# Patient Record
Sex: FEMALE | Race: WHITE | NOT HISPANIC OR LATINO | Employment: OTHER | URBAN - METROPOLITAN AREA
[De-identification: names, ages, dates, MRNs, and addresses within clinical notes are randomized per-mention and may not be internally consistent; named-entity substitution may affect disease eponyms.]

---

## 2017-10-06 ENCOUNTER — HOSPITAL ENCOUNTER (OUTPATIENT)
Dept: MAMMOGRAPHY | Facility: CLINIC | Age: 77
Discharge: HOME | End: 2017-10-06
Attending: INTERNAL MEDICINE | Admitting: INTERNAL MEDICINE
Payer: MEDICARE

## 2017-10-06 DIAGNOSIS — Z12.31 VISIT FOR SCREENING MAMMOGRAM: ICD-10-CM

## 2017-10-06 PROCEDURE — G0202 SCR MAMMO BI INCL CAD: HCPCS

## 2018-11-02 ENCOUNTER — HOSPITAL ENCOUNTER (OUTPATIENT)
Dept: MAMMOGRAPHY | Facility: CLINIC | Age: 78
Discharge: HOME | End: 2018-11-02
Attending: INTERNAL MEDICINE | Admitting: INTERNAL MEDICINE
Payer: MEDICARE

## 2018-11-02 DIAGNOSIS — Z12.31 VISIT FOR SCREENING MAMMOGRAM: ICD-10-CM

## 2018-11-02 PROCEDURE — 77067 SCR MAMMO BI INCL CAD: CPT

## 2024-01-29 ENCOUNTER — DOCUMENTATION ONLY (OUTPATIENT)
Dept: GERIATRICS | Facility: CLINIC | Age: 84
End: 2024-01-29
Payer: COMMERCIAL

## 2024-01-30 ENCOUNTER — ASSISTED LIVING VISIT (OUTPATIENT)
Dept: GERIATRICS | Facility: CLINIC | Age: 84
End: 2024-01-30
Payer: COMMERCIAL

## 2024-01-30 ENCOUNTER — DOCUMENTATION ONLY (OUTPATIENT)
Dept: OTHER | Facility: CLINIC | Age: 84
End: 2024-01-30

## 2024-01-30 VITALS
DIASTOLIC BLOOD PRESSURE: 68 MMHG | BODY MASS INDEX: 23.96 KG/M2 | TEMPERATURE: 98.2 F | WEIGHT: 143.8 LBS | OXYGEN SATURATION: 98 % | HEART RATE: 98 BPM | SYSTOLIC BLOOD PRESSURE: 151 MMHG | RESPIRATION RATE: 18 BRPM | HEIGHT: 65 IN

## 2024-01-30 DIAGNOSIS — K21.9 GASTROESOPHAGEAL REFLUX DISEASE WITHOUT ESOPHAGITIS: ICD-10-CM

## 2024-01-30 DIAGNOSIS — F39 EPISODIC MOOD DISORDER (H): ICD-10-CM

## 2024-01-30 DIAGNOSIS — H91.90 HEARING LOSS, UNSPECIFIED HEARING LOSS TYPE, UNSPECIFIED LATERALITY: Primary | ICD-10-CM

## 2024-01-30 DIAGNOSIS — N25.81 HYPERPARATHYROIDISM, SECONDARY (H): ICD-10-CM

## 2024-01-30 DIAGNOSIS — M85.80 OSTEOPENIA, UNSPECIFIED LOCATION: ICD-10-CM

## 2024-01-30 PROCEDURE — 99344 HOME/RES VST NEW MOD MDM 60: CPT | Performed by: NURSE PRACTITIONER

## 2024-01-30 NOTE — PROGRESS NOTES
[facility]  PRIMARY CARE PROVIDER AND CLINIC:  Yobani Kelly, BROOKE CNP, [address] [address]  Chief Complaint   Patient presents with    Establish Care     [place] Medical Record Number:  [id number]  Place of Service where encounter took place:  [facility] [[id number]]    Kimberley Fernandez  is a 83 year old  (1940),  admitted to the above facility on 1/29/24 .  Admitted to this facility for  medical management and nursing care.    HPI:    HPI information obtained from: facility chart records, facility staff, patient report, and [facility] chart review.     Seen today for start of care. She has moved into AL setting from the community as no longer able to care for her . PMH includes SCC, GERD, Hyperlipidemia, Secondary Hyperparathyroidism, Depression, Osteopenia, B12 and D deficiency. She denies any acute concerns. Any previous medications used have been stopped. She reports being overwhelmed with the move. She does not want to take any medications including vitamins or supplements.     CODE STATUS/ADVANCE DIRECTIVES DISCUSSION:   CPR/Full code   Patient's living condition: lives in an assisted living facility  ALLERGIES: No known drug allergy  PAST MEDICAL HISTORY:  has a past medical history of Cancer (H), Depressive disorder, not elsewhere classified, Esophageal reflux, Impaired fasting glucose, Nonspecific elevation of levels of transaminase or lactic acid dehydrogenase (LDH), OSTEOPENIA (2000), Other and unspecified hyperlipidemia (10/02), and Personal history of colonic polyps (2005).  PAST SURGICAL HISTORY:   has a past surgical history that includes NONSPECIFIC PROCEDURE (1977); NONSPECIFIC PROCEDURE (1985); NONSPECIFIC PROCEDURE (6/00); NONSPECIFIC PROCEDURE; NONSPECIFIC PROCEDURE; partial hysterectomy (age 38); cataracts removed (2010); IMPLANTABLE BREAST PROSTHESIS (2003); and Colonoscopy (11/1/2011).  FAMILY HISTORY: family  "history includes Bone Cancer in her mother; Liver Cancer in her father.  SOCIAL HISTORY:   reports that she has never smoked. She has never used smokeless tobacco. She reports current alcohol use. She reports that she does not use drugs.    Post Discharge Medication Reconciliation Status: unable to reconcile discharge medications due to has stopped all medications     No current outpatient medications on file.     ROS:  4 point ROS including Respiratory, CV, GI and , other than that noted in the HPI,  is negative    Vitals:  BP (!) 151/68   Pulse 98   Temp 98.2  F (36.8  C)   Resp 18   Ht 1.651 m (5' 5\")   Wt 65.2 kg (143 lb 12.8 oz)   SpO2 98%   BMI 23.93 kg/m    Exam:  GENERAL APPEARANCE:  Alert, anxious  ENT:  Mouth and posterior oropharynx normal, moist mucous membranes, [place]-ears clear of wax  EYES:  EOM, conjunctivae, lids, pupils and irises normal  RESP:  lungs clear to auscultation   CV:  Palpation and auscultation of heart done , regular rate and rhythm, no murmur, rub, or gallop, trace edema  ABDOMEN:  no guarding or rebound  M/S:   without assistive device  SKIN:  limited but intact to visualized areas   NEURO:   Cranial nerves 2-12 are normal tested and grossly at patient's baseline  PSYCH:  oriented X 3, anxious    Lab/Diagnostic data:  No recent labs-refused onsite draw    ASSESSMENT/PLAN:  (H91.90) Hearing loss, unspecified hearing loss type, unspecified laterality  (primary encounter diagnosis)  Comment: ongoing   Plan:   -wearing aids, may benefit from pocket talker    (F39) Episodic mood disorder (H24)  Comment: anxious  Plan:   -refused ACP or medication  -would hope in time may settle into AL setting     (N25.81) Hyperparathyroidism, secondary (H24)  Comment: by history has seen endocrinology in past noted indicate could be related to vitamin D deficiency   Plan:   -labs when and if she is willing     (K21.9) Gastroesophageal reflux disease without esophagitis  Comment: hx of   Plan: "   -off medication. Monitor for s/s     (M85.80) Osteopenia, unspecified location  Comment: needs DXA Bone density Spine/hip  Plan:   -refusing recommended calcium and vitamin D    -VS on 1/23 124/68 87, BP/HR elevated on admission     Total time spent with patient visit at the skilled nursing facility was 63 minutes including patient visit, review of past records, phone call to patient contact, and discussion with nursing, aids, DHS     Electronically signed by:  BROOKE Toth CNP

## 2024-01-30 NOTE — LETTER
1/30/2024        RE: Kimberley Fernandez  [facility]  [facility]  [address] [place] [address]        [facility]  PRIMARY CARE PROVIDER AND CLINIC:  BROOKE Toth [facility], [address] [facility] [place] [address]  Chief Complaint   Patient presents with     Establish Care     [place] Medical Record Number:  [id number]  Place of Service where encounter took place:  [facility] [[id number]]    Kimberley Fernandez  is a 83 year old  (1940),  admitted to the above facility on 1/29/24 .  Admitted to this facility for  medical management and nursing care.    HPI:    HPI information obtained from: facility chart records, facility staff, patient report, and [facility] chart review.     Seen today for start of care. She has moved into AL setting from the community as no longer able to care for her . PMH includes SCC, GERD, Hyperlipidemia, Secondary Hyperparathyroidism, Depression, Osteopenia, B12 and D deficiency. She denies any acute concerns. Any previous medications used have been stopped. She reports being overwhelmed with the move. She does not want to take any medications including vitamins or supplements.     CODE STATUS/ADVANCE DIRECTIVES DISCUSSION:   CPR/Full code   Patient's living condition: lives in an assisted living facility  ALLERGIES: No known drug allergy  PAST MEDICAL HISTORY:  has a past medical history of Cancer (H), Depressive disorder, not elsewhere classified, Esophageal reflux, Impaired fasting glucose, Nonspecific elevation of levels of transaminase or lactic acid dehydrogenase (LDH), OSTEOPENIA (2000), Other and unspecified hyperlipidemia (10/02), and Personal history of colonic polyps (2005).  PAST SURGICAL HISTORY:   has a past surgical history that includes NONSPECIFIC PROCEDURE (1977); NONSPECIFIC PROCEDURE (1985); NONSPECIFIC PROCEDURE (6/00); NONSPECIFIC PROCEDURE; NONSPECIFIC PROCEDURE; partial hysterectomy (age 38);  "cataracts removed (2010); IMPLANTABLE BREAST PROSTHESIS (2003); and Colonoscopy (11/1/2011).  FAMILY HISTORY: family history includes Bone Cancer in her mother; Liver Cancer in her father.  SOCIAL HISTORY:   reports that she has never smoked. She has never used smokeless tobacco. She reports current alcohol use. She reports that she does not use drugs.    Post Discharge Medication Reconciliation Status: unable to reconcile discharge medications due to has stopped all medications     No current outpatient medications on file.     ROS:  4 point ROS including Respiratory, CV, GI and , other than that noted in the HPI,  is negative    Vitals:  BP (!) 151/68   Pulse 98   Temp 98.2  F (36.8  C)   Resp 18   Ht 1.651 m (5' 5\")   Wt 65.2 kg (143 lb 12.8 oz)   SpO2 98%   BMI 23.93 kg/m    Exam:  GENERAL APPEARANCE:  Alert, anxious  ENT:  Mouth and posterior oropharynx normal, moist mucous membranes, [place]-ears clear of wax  EYES:  EOM, conjunctivae, lids, pupils and irises normal  RESP:  lungs clear to auscultation   CV:  Palpation and auscultation of heart done , regular rate and rhythm, no murmur, rub, or gallop, trace edema  ABDOMEN:  no guarding or rebound  M/S:   without assistive device  SKIN:  limited but intact to visualized areas   NEURO:   Cranial nerves 2-12 are normal tested and grossly at patient's baseline  PSYCH:  oriented X 3, anxious    Lab/Diagnostic data:  No recent labs-refused onsite draw    ASSESSMENT/PLAN:  (H91.90) Hearing loss, unspecified hearing loss type, unspecified laterality  (primary encounter diagnosis)  Comment: ongoing   Plan:   -wearing aids, may benefit from pocket talker    (F39) Episodic mood disorder (H24)  Comment: anxious  Plan:   -refused ACP or medication  -would hope in time may settle into AL setting     (N25.81) Hyperparathyroidism, secondary (H24)  Comment: by history has seen endocrinology in past noted indicate could be related to vitamin D deficiency   Plan:   -labs " when and if she is willing     (K21.9) Gastroesophageal reflux disease without esophagitis  Comment: hx of   Plan:   -off medication. Monitor for s/s     (M85.80) Osteopenia, unspecified location  Comment: needs DXA Bone density Spine/hip  Plan:   -refusing recommended calcium and vitamin D    -VS on 1/23 124/68 87, BP/HR elevated on admission     Total time spent with patient visit at the [facility] nursing [facility] was 63 minutes including patient visit, review of past records, phone call to patient contact, and discussion with nursing, aids, DHS     Electronically signed by:  BROOKE Toth CNP                     Sincerely,        BROOKE Toth CNP

## 2024-02-05 ENCOUNTER — DOCUMENTATION ONLY (OUTPATIENT)
Dept: OTHER | Facility: CLINIC | Age: 84
End: 2024-02-05
Payer: COMMERCIAL

## 2024-02-13 ENCOUNTER — ASSISTED LIVING VISIT (OUTPATIENT)
Dept: GERIATRICS | Facility: CLINIC | Age: 84
End: 2024-02-13
Payer: COMMERCIAL

## 2024-02-13 VITALS
BODY MASS INDEX: 23.59 KG/M2 | SYSTOLIC BLOOD PRESSURE: 119 MMHG | TEMPERATURE: 97.6 F | DIASTOLIC BLOOD PRESSURE: 60 MMHG | RESPIRATION RATE: 20 BRPM | WEIGHT: 141.6 LBS | HEART RATE: 92 BPM | OXYGEN SATURATION: 97 % | HEIGHT: 65 IN

## 2024-02-13 DIAGNOSIS — N25.81 HYPERPARATHYROIDISM, SECONDARY (H): ICD-10-CM

## 2024-02-13 DIAGNOSIS — E78.5 HYPERLIPIDEMIA LDL GOAL <130: ICD-10-CM

## 2024-02-13 DIAGNOSIS — I10 PRIMARY HYPERTENSION: ICD-10-CM

## 2024-02-13 DIAGNOSIS — F39 EPISODIC MOOD DISORDER (H): Primary | ICD-10-CM

## 2024-02-13 DIAGNOSIS — E53.8 VITAMIN B12 DEFICIENCY WITHOUT ANEMIA: ICD-10-CM

## 2024-02-13 DIAGNOSIS — E54 VITAMIN C DEFICIENCY: ICD-10-CM

## 2024-02-13 PROCEDURE — 99349 HOME/RES VST EST MOD MDM 40: CPT | Performed by: NURSE PRACTITIONER

## 2024-02-13 NOTE — LETTER
Faby Fernandez        BMP, CBC, LFTs,  for HTN  B12, for B12 deficiency  D for D deficiency   Lipid for hyperlipidemia   TSH for thyroid screen    PTH for hyperparathyroidism         Yobani Kelly, BROOKE CNP on 2/13/2024 at 5:45 PM

## 2024-02-13 NOTE — LETTER
"    2/13/2024        RE: Kimberley Edwards  [address] [place] [address]        [facility]  [place] Medical Record Number:  [id number]  Place of Service where encounter took place:  [facility] [[id number]]  Chief Complaint   Patient presents with     RECHECK       HPI:    Kimberley Fernandez  is a 83 year old (1940), who is being seen today for an episodic care visit.  HPI information obtained from: patient report. Today's concern is:    Seen today for follow up to start of care. Appears less stressed. Says she is sleeping better. No acute concerns. Is thinking about ACP therapy. Has the paperwork but not ready to complete. Asked about obtaining some updated labs. \"Don't want to know if anything is wrong with me\". Agrees for labs only since her sister was recently hospitalized with CHF. PMH includes SCC, GERD, Hyperlipidemia, Secondary Hyperparathyroidism, Depression, Osteopenia, B12 and D deficiency. Any previous medications used have been stopped and she does not believe in taking pills.     Past Medical and Surgical History reviewed in Epic today.    MEDICATIONS:    No current outpatient medications on file.       REVIEW OF SYSTEMS:  4 point ROS including Respiratory, CV, GI and , other than that noted in the HPI,  is negative    Objective:  /60   Pulse 92   Temp 97.6  F (36.4  C)   Resp 20   Ht 1.651 m (5' 5\")   Wt 64.2 kg (141 lb 9.6 oz)   SpO2 97%   BMI 23.56 kg/m    Exam:  GENERAL APPEARANCE:  Alert, less anxious  ENT:  Mouth and posterior oropharynx normal, moist mucous membranes, [place]-ears clear of wax  EYES:  EOM, conjunctivae, lids, pupils and irises normal  RESP:  lungs clear to auscultation   CV:  Palpation and auscultation of heart done , regular rate and rhythm, no murmur, rub, or gallop, trace edema  ABDOMEN:  no guarding or rebound  M/S:   without assistive device  SKIN:  limited but intact to visualized " areas, dry legs, ankles and feet  NEURO:   Cranial nerves 2-12 are normal tested and grossly at patient's baseline  PSYCH:  oriented X 3, anxious    Labs:   Agreed for updated labs today    BMP, CBC, LFTs, B12, D, Lipid, TSH, PTH    ASSESSMENT/PLAN:  (F39) Episodic mood disorder (H24)  (primary encounter diagnosis)  (E53.8) Vitamin B12 deficiency without anemia  (E54) Vitamin C deficiency  (E78.5) Hyperlipidemia LDL goal <130  (N25.81) Hyperparathyroidism, secondary (H24)  (I10) Primary hypertension  Comment: difficulty with transition to AL from community   Plan:   -eval to ACP has been given  -updated labs      Electronically signed by:  BROOKE Toth CNP             Sincerely,        BROOKE Toth CNP

## 2024-02-13 NOTE — PROGRESS NOTES
"[facility]  [place] Medical Record Number:  [id number]  Place of Service where encounter took place:  [facility] [[id number]]  Chief Complaint   Patient presents with    RECHECK       HPI:    Kimberley Fernandez  is a 83 year old (1940), who is being seen today for an episodic care visit.  HPI information obtained from: patient report. Today's concern is:    Seen today for follow up to start of care. Appears less stressed. Says she is sleeping better. No acute concerns. Is thinking about ACP therapy. Has the paperwork but not ready to complete. Asked about obtaining some updated labs. \"Don't want to know if anything is wrong with me\". Agrees for labs only since her sister was recently hospitalized with CHF. PMH includes SCC, GERD, Hyperlipidemia, Secondary Hyperparathyroidism, Depression, Osteopenia, B12 and D deficiency. Any previous medications used have been stopped and she does not believe in taking pills.     Past Medical and Surgical History reviewed in Epic today.    MEDICATIONS:    No current outpatient medications on file.       REVIEW OF SYSTEMS:  4 point ROS including Respiratory, CV, GI and , other than that noted in the HPI,  is negative    Objective:  /60   Pulse 92   Temp 97.6  F (36.4  C)   Resp 20   Ht 1.651 m (5' 5\")   Wt 64.2 kg (141 lb 9.6 oz)   SpO2 97%   BMI 23.56 kg/m    Exam:  GENERAL APPEARANCE:  Alert, less anxious  ENT:  Mouth and posterior oropharynx normal, moist mucous membranes, [place]-ears clear of wax  EYES:  EOM, conjunctivae, lids, pupils and irises normal  RESP:  lungs clear to auscultation   CV:  Palpation and auscultation of heart done , regular rate and rhythm, no murmur, rub, or gallop, trace edema  ABDOMEN:  no guarding or rebound  M/S:   without assistive device  SKIN:  limited but intact to visualized areas, dry legs, ankles and feet  NEURO:   Cranial nerves 2-12 are normal tested and grossly at patient's " baseline  PSYCH:  oriented X 3, anxious    Labs:   Agreed for updated labs today    BMP, CBC, LFTs, B12, D, Lipid, TSH, PTH    ASSESSMENT/PLAN:  (F39) Episodic mood disorder (H24)  (primary encounter diagnosis)  (E53.8) Vitamin B12 deficiency without anemia  (E54) Vitamin C deficiency  (E78.5) Hyperlipidemia LDL goal <130  (N25.81) Hyperparathyroidism, secondary (H24)  (I10) Primary hypertension  Comment: difficulty with transition to AL from community   Plan:   -eval to ACP has been given  -updated labs      Electronically signed by:  BROOKE Toth CNP

## 2024-02-14 ENCOUNTER — LAB REQUISITION (OUTPATIENT)
Dept: LAB | Facility: CLINIC | Age: 84
End: 2024-02-14
Payer: COMMERCIAL

## 2024-02-14 DIAGNOSIS — E03.9 HYPOTHYROIDISM, UNSPECIFIED: ICD-10-CM

## 2024-02-14 DIAGNOSIS — E55.9 VITAMIN D DEFICIENCY, UNSPECIFIED: ICD-10-CM

## 2024-02-14 DIAGNOSIS — E78.5 HYPERLIPIDEMIA, UNSPECIFIED: ICD-10-CM

## 2024-02-14 DIAGNOSIS — D51.9 VITAMIN B12 DEFICIENCY ANEMIA, UNSPECIFIED: ICD-10-CM

## 2024-02-14 DIAGNOSIS — I10 ESSENTIAL (PRIMARY) HYPERTENSION: ICD-10-CM

## 2024-02-20 LAB
ALBUMIN SERPL BCG-MCNC: 3.8 G/DL (ref 3.5–5.2)
ALP SERPL-CCNC: 104 U/L (ref 40–150)
ALT SERPL W P-5'-P-CCNC: 14 U/L (ref 0–50)
ANION GAP SERPL CALCULATED.3IONS-SCNC: 9 MMOL/L (ref 7–15)
AST SERPL W P-5'-P-CCNC: 19 U/L (ref 0–45)
BILIRUB DIRECT SERPL-MCNC: <0.2 MG/DL (ref 0–0.3)
BILIRUB SERPL-MCNC: 0.2 MG/DL
BUN SERPL-MCNC: 17 MG/DL (ref 8–23)
CALCIUM SERPL-MCNC: 9.1 MG/DL (ref 8.8–10.2)
CHLORIDE SERPL-SCNC: 105 MMOL/L (ref 98–107)
CHOLEST SERPL-MCNC: 212 MG/DL
CREAT SERPL-MCNC: 0.98 MG/DL (ref 0.51–0.95)
DEPRECATED HCO3 PLAS-SCNC: 26 MMOL/L (ref 22–29)
EGFRCR SERPLBLD CKD-EPI 2021: 56 ML/MIN/1.73M2
ERYTHROCYTE [DISTWIDTH] IN BLOOD BY AUTOMATED COUNT: 14.8 % (ref 10–15)
FASTING STATUS PATIENT QL REPORTED: ABNORMAL
GLUCOSE SERPL-MCNC: 95 MG/DL (ref 70–99)
HCT VFR BLD AUTO: 44.4 % (ref 35–47)
HDLC SERPL-MCNC: 46 MG/DL
HGB BLD-MCNC: 14.1 G/DL (ref 11.7–15.7)
LDLC SERPL CALC-MCNC: 141 MG/DL
MCH RBC QN AUTO: 28.2 PG (ref 26.5–33)
MCHC RBC AUTO-ENTMCNC: 31.8 G/DL (ref 31.5–36.5)
MCV RBC AUTO: 89 FL (ref 78–100)
NONHDLC SERPL-MCNC: 166 MG/DL
PLATELET # BLD AUTO: 187 10E3/UL (ref 150–450)
POTASSIUM SERPL-SCNC: 4.4 MMOL/L (ref 3.4–5.3)
PROT SERPL-MCNC: 6.6 G/DL (ref 6.4–8.3)
PTH-INTACT SERPL-MCNC: 91 PG/ML (ref 15–65)
RBC # BLD AUTO: 5 10E6/UL (ref 3.8–5.2)
SODIUM SERPL-SCNC: 140 MMOL/L (ref 135–145)
TRIGL SERPL-MCNC: 127 MG/DL
TSH SERPL DL<=0.005 MIU/L-ACNC: 1.75 UIU/ML (ref 0.3–4.2)
VIT B12 SERPL-MCNC: 229 PG/ML (ref 232–1245)
WBC # BLD AUTO: 6.7 10E3/UL (ref 4–11)

## 2024-02-20 PROCEDURE — 80061 LIPID PANEL: CPT | Mod: ORL | Performed by: NURSE PRACTITIONER

## 2024-02-20 PROCEDURE — 36415 COLL VENOUS BLD VENIPUNCTURE: CPT | Mod: ORL | Performed by: NURSE PRACTITIONER

## 2024-02-20 PROCEDURE — 82607 VITAMIN B-12: CPT | Mod: ORL | Performed by: NURSE PRACTITIONER

## 2024-02-20 PROCEDURE — 82306 VITAMIN D 25 HYDROXY: CPT | Mod: ORL | Performed by: NURSE PRACTITIONER

## 2024-02-20 PROCEDURE — 84443 ASSAY THYROID STIM HORMONE: CPT | Mod: ORL | Performed by: NURSE PRACTITIONER

## 2024-02-20 PROCEDURE — 82248 BILIRUBIN DIRECT: CPT | Mod: ORL | Performed by: NURSE PRACTITIONER

## 2024-02-20 PROCEDURE — 80053 COMPREHEN METABOLIC PANEL: CPT | Mod: ORL | Performed by: NURSE PRACTITIONER

## 2024-02-20 PROCEDURE — 85027 COMPLETE CBC AUTOMATED: CPT | Mod: ORL | Performed by: NURSE PRACTITIONER

## 2024-02-20 PROCEDURE — 83970 ASSAY OF PARATHORMONE: CPT | Mod: ORL | Performed by: NURSE PRACTITIONER

## 2024-02-20 PROCEDURE — P9604 ONE-WAY ALLOW PRORATED TRIP: HCPCS | Mod: ORL | Performed by: NURSE PRACTITIONER

## 2024-02-24 LAB
DEPRECATED CALCIDIOL+CALCIFEROL SERPL-MC: <26 UG/L (ref 20–75)
VITAMIN D2 SERPL-MCNC: <5 UG/L
VITAMIN D3 SERPL-MCNC: 21 UG/L

## 2024-02-26 NOTE — PROGRESS NOTES
"[facility]  [place] Medical Record Number:  [id number]  Place of Service where encounter took place:  [facility] [[id number]]  Chief Complaint   Patient presents with    RECHECK     Medication / lab review       HPI:    Kimberley Fernandez  is a 83 year old (1940), who is being seen today for an episodic care visit.  HPI information obtained from: facility chart records and patient report. Today's concern is:    Seen today for follow up to recent labs. Resident new to AL setting and stopped taking all of her medications. Recently agreed to labs with family member in the hospital. She denies any acute concerns. Seem to be adjusted in to AL setting. Declined ACP therapy.  PMH includes SCC, GERD, Hyperlipidemia, Secondary Hyperparathyroidism, Depression, Osteopenia, B12 and D deficiency.     Past Medical and Surgical History reviewed in Epic today.    MEDICATIONS:    No current outpatient medications on file.       REVIEW OF SYSTEMS:  4 point ROS including Respiratory, CV, GI and , other than that noted in the HPI,  is negative    Objective:  BP (!) 140/63   Pulse 79   Temp 97.6  F (36.4  C)   Resp 20   Ht 1.651 m (5' 5\")   Wt 64.2 kg (141 lb 9.6 oz)   SpO2 97%   BMI 23.56 kg/m    Exam:  GENERAL APPEARANCE:  Alert, less anxious, calm and pleasant   ENT:  Mouth and posterior oropharynx normal, moist mucous membranes, [place]-ears clear of wax  EYES:  EOM, conjunctivae, lids, pupils and irises normal  RESP:  lungs clear to auscultation   CV:  Palpation and auscultation of heart done , regular rate and rhythm, no murmur, rub, or gallop, trace edema  ABDOMEN:  no guarding or rebound  M/S:   without assistive device  SKIN:  limited but intact to visualized areas, dry legs, ankles and feet  NEURO:   Cranial nerves 2-12 are normal tested and grossly at patient's baseline  PSYCH:  oriented X 3, anxious    Labs:   CBC RESULTS:   Recent Labs   Lab Test 02/20/24  1014   WBC 6.7 "   RBC 5.00   HGB 14.1   HCT 44.4   MCV 89   MCH 28.2   MCHC 31.8   RDW 14.8          Last Basic Metabolic Panel:  Recent Labs   Lab Test 02/20/24  1014      POTASSIUM 4.4   CHLORIDE 105   APRIL 9.1   CO2 26   BUN 17.0   CR 0.98*   GLC 95       Liver Function Studies -   Recent Labs   Lab Test 02/20/24  1014   PROTTOTAL 6.6   ALBUMIN 3.8   BILITOTAL 0.2   ALKPHOS 104   AST 19   ALT 14       TSH   Date Value Ref Range Status   02/20/2024 1.75 0.30 - 4.20 uIU/mL Final   09/28/2012 1.49 0.4 - 5.0 mU/L Final   05/14/2009 1.31 0.4 - 5.0 mU/L Final       Lab Results   Component Value Date    A1C 6.2 10/18/2006    A1C 6.3 10/14/2005     Recent Labs   Lab Test 02/20/24  1014   CHOL 212*   HDL 46*   *   TRIG 127     PTH: 91  Vitamin D deficiency at <26  Vitamin B12 at 229      ASSESSMENT/PLAN:  (N25.81) Hyperparathyroidism, secondary (H24)  (primary encounter diagnosis)  (E55.9) Vitamin D deficiency  Comment: secondary to D deficiency   Plan:   -Vitamin D daily-will bring up slowly for better compliance   -follow labs    (E53.8) Vitamin B12 deficiency without anemia  Comment: long hx, prior use of B12 injections   Plan:   -vitamin B12 daily  -follow labs    (E78.5) Hyperlipidemia LDL goal <130  Comment: prior use of statin. Not clear as to why stopped. Denies arthralgia, headaches, pain with use. Reviewed s/s and complications of increased lipids  Plan:   -refuses to restart statin at this time      Electronically signed by:  Yobani Kelly, BROOKE CNP

## 2024-02-27 ENCOUNTER — ASSISTED LIVING VISIT (OUTPATIENT)
Dept: GERIATRICS | Facility: CLINIC | Age: 84
End: 2024-02-27
Payer: COMMERCIAL

## 2024-02-27 VITALS
BODY MASS INDEX: 23.59 KG/M2 | DIASTOLIC BLOOD PRESSURE: 70 MMHG | RESPIRATION RATE: 16 BRPM | TEMPERATURE: 97.6 F | HEIGHT: 65 IN | WEIGHT: 141.6 LBS | OXYGEN SATURATION: 97 % | SYSTOLIC BLOOD PRESSURE: 120 MMHG | HEART RATE: 78 BPM

## 2024-02-27 DIAGNOSIS — N25.81 HYPERPARATHYROIDISM, SECONDARY (H): Primary | ICD-10-CM

## 2024-02-27 DIAGNOSIS — E53.8 VITAMIN B12 DEFICIENCY WITHOUT ANEMIA: ICD-10-CM

## 2024-02-27 DIAGNOSIS — E55.9 VITAMIN D DEFICIENCY: ICD-10-CM

## 2024-02-27 DIAGNOSIS — E78.5 HYPERLIPIDEMIA LDL GOAL <130: ICD-10-CM

## 2024-02-27 PROCEDURE — 99349 HOME/RES VST EST MOD MDM 40: CPT | Performed by: NURSE PRACTITIONER

## 2024-02-27 RX ORDER — VITAMIN B COMPLEX
25 TABLET ORAL DAILY
Qty: 30 TABLET | Refills: 5 | Status: SHIPPED | OUTPATIENT
Start: 2024-02-27 | End: 2024-03-19

## 2024-02-27 RX ORDER — LANOLIN ALCOHOL/MO/W.PET/CERES
1000 CREAM (GRAM) TOPICAL DAILY
Qty: 30 TABLET | Refills: 5 | Status: SHIPPED | OUTPATIENT
Start: 2024-02-27 | End: 2024-03-19

## 2024-02-27 NOTE — LETTER
Faby Fernandez orders    cyanocobalamin (VITAMIN B-12) 1000 MCG tablet Take 1 tablet (1,000 mcg) by mouth daily   Vitamin D3 (CHOLECALCIFEROL) 25 mcg (1000 units) tablet Take 1 tablet (25 mcg) by mouth daily        -ok to self administer if facility allows        BROOKE Toth CNP on 2/27/2024 at 3:53 PM

## 2024-02-27 NOTE — LETTER
"    2/27/2024        RE: Kimberley Edwards  [address] [place] [address]        [facility]  [place] Medical Record Number:  [id number]  Place of Service where encounter took place:  [facility] [[id number]]  Chief Complaint   Patient presents with     RECHECK     Medication / lab review       HPI:    Kimberley Fernandez  is a 83 year old (1940), who is being seen today for an episodic care visit.  HPI information obtained from: facility chart records and patient report. Today's concern is:    Seen today for follow up to recent labs. Resident new to AL setting and stopped taking all of her medications. Recently agreed to labs with family member in the hospital. She denies any acute concerns. Seem to be adjusted in to AL setting. Declined ACP therapy.  PMH includes SCC, GERD, Hyperlipidemia, Secondary Hyperparathyroidism, Depression, Osteopenia, B12 and D deficiency.     Past Medical and Surgical History reviewed in Epic today.    MEDICATIONS:    No current outpatient medications on file.       REVIEW OF SYSTEMS:  4 point ROS including Respiratory, CV, GI and , other than that noted in the HPI,  is negative    Objective:  BP (!) 140/63   Pulse 79   Temp 97.6  F (36.4  C)   Resp 20   Ht 1.651 m (5' 5\")   Wt 64.2 kg (141 lb 9.6 oz)   SpO2 97%   BMI 23.56 kg/m    Exam:  GENERAL APPEARANCE:  Alert, less anxious, calm and pleasant   ENT:  Mouth and posterior oropharynx normal, moist mucous membranes, [place]-ears clear of wax  EYES:  EOM, conjunctivae, lids, pupils and irises normal  RESP:  lungs clear to auscultation   CV:  Palpation and auscultation of heart done , regular rate and rhythm, no murmur, rub, or gallop, trace edema  ABDOMEN:  no guarding or rebound  M/S:   without assistive device  SKIN:  limited but intact to visualized areas, dry legs, ankles and feet  NEURO:   Cranial nerves 2-12 are normal tested and grossly at patient's " baseline  PSYCH:  oriented X 3, anxious    Labs:   CBC RESULTS:   Recent Labs   Lab Test 02/20/24  1014   WBC 6.7   RBC 5.00   HGB 14.1   HCT 44.4   MCV 89   MCH 28.2   MCHC 31.8   RDW 14.8          Last Basic Metabolic Panel:  Recent Labs   Lab Test 02/20/24  1014      POTASSIUM 4.4   CHLORIDE 105   APRIL 9.1   CO2 26   BUN 17.0   CR 0.98*   GLC 95       Liver Function Studies -   Recent Labs   Lab Test 02/20/24  1014   PROTTOTAL 6.6   ALBUMIN 3.8   BILITOTAL 0.2   ALKPHOS 104   AST 19   ALT 14       TSH   Date Value Ref Range Status   02/20/2024 1.75 0.30 - 4.20 uIU/mL Final   09/28/2012 1.49 0.4 - 5.0 mU/L Final   05/14/2009 1.31 0.4 - 5.0 mU/L Final       Lab Results   Component Value Date    A1C 6.2 10/18/2006    A1C 6.3 10/14/2005     Recent Labs   Lab Test 02/20/24  1014   CHOL 212*   HDL 46*   *   TRIG 127     PTH: 91  Vitamin D deficiency at <26  Vitamin B12 at 229      ASSESSMENT/PLAN:  (N25.81) Hyperparathyroidism, secondary (H24)  (primary encounter diagnosis)  (E55.9) Vitamin D deficiency  Comment: secondary to D deficiency   Plan:   -Vitamin D daily-will bring up slowly for better compliance   -follow labs    (E53.8) Vitamin B12 deficiency without anemia  Comment: long hx, prior use of B12 injections   Plan:   -vitamin B12 daily  -follow labs    (E78.5) Hyperlipidemia LDL goal <130  Comment: prior use of statin. Not clear as to why stopped. Denies arthralgia, headaches, pain with use. Reviewed s/s and complications of increased lipids  Plan:   -refuses to restart statin at this time      Electronically signed by:  BROOKE Toth CNP             Sincerely,        BROOKE Toth CNP

## 2024-02-29 DIAGNOSIS — R09.81 NASAL CONGESTION: Primary | ICD-10-CM

## 2024-02-29 RX ORDER — FEXOFENADINE HCL AND PSEUDOEPHEDRINE HCL 180; 240 MG/1; MG/1
1 TABLET, EXTENDED RELEASE ORAL EVERY MORNING
Qty: 1 TABLET | Refills: 0 | Status: SHIPPED | OUTPATIENT
Start: 2024-02-29 | End: 2024-03-07

## 2024-02-29 NOTE — PROGRESS NOTES
Resident with nasal congestion, cold s/s. Somewhat limited options with LTC pharmacy. Reviewed contraindications- will use 7 tablets or until cleared. Must dispense the entire box     Plan:  fexofenadine-pseudoePHEDrine (ALLEGRA-D 24) 180-240 MG 24 hr tablet Take 1 tablet by mouth every morning for 7 days or until cold symptoms resolve     BROOKE Toth CNP on 2/29/2024 at 1:27 PM

## 2024-03-19 ENCOUNTER — ASSISTED LIVING VISIT (OUTPATIENT)
Dept: GERIATRICS | Facility: CLINIC | Age: 84
End: 2024-03-19
Payer: COMMERCIAL

## 2024-03-19 VITALS
SYSTOLIC BLOOD PRESSURE: 110 MMHG | OXYGEN SATURATION: 96 % | RESPIRATION RATE: 18 BRPM | BODY MASS INDEX: 24.34 KG/M2 | WEIGHT: 146.1 LBS | HEART RATE: 84 BPM | TEMPERATURE: 97.6 F | HEIGHT: 65 IN | DIASTOLIC BLOOD PRESSURE: 54 MMHG

## 2024-03-19 DIAGNOSIS — E78.5 HYPERLIPIDEMIA LDL GOAL <130: ICD-10-CM

## 2024-03-19 DIAGNOSIS — N18.31 STAGE 3A CHRONIC KIDNEY DISEASE (H): ICD-10-CM

## 2024-03-19 DIAGNOSIS — I10 PRIMARY HYPERTENSION: ICD-10-CM

## 2024-03-19 DIAGNOSIS — E53.8 VITAMIN B12 DEFICIENCY WITHOUT ANEMIA: ICD-10-CM

## 2024-03-19 DIAGNOSIS — M85.80 OSTEOPENIA, UNSPECIFIED LOCATION: ICD-10-CM

## 2024-03-19 DIAGNOSIS — E55.9 VITAMIN D DEFICIENCY: Primary | ICD-10-CM

## 2024-03-19 PROCEDURE — 99348 HOME/RES VST EST LOW MDM 30: CPT | Performed by: INTERNAL MEDICINE

## 2024-03-19 NOTE — LETTER
Faby Fernandez orders:    -discontinue vitamin D and vitamin B12        Yobani Kelly, BROOKE CNP on 3/19/2024 at 11:22 AM

## 2024-03-19 NOTE — LETTER
3/19/2024        RE: Kimberley BADILLO Jim  [facility]  [id number] [place] [address] [place] [address]        No notes on file      Sincerely,        Serene Hughes MD       0

## 2024-04-03 NOTE — PROGRESS NOTES
Kimberley Fernandez is a 83 year old female seen March 19, 2024 at [facility] where she has resided for 2 months (admit 1/2024) seen for initial visit.   Pt is seen in her apartment she shares with her , up to chair.   Move to [place] necessitated by his higher care needs, and she has moved with him.    Denies any concerns and refuses any medications secondary to the high [place] cost of med passes.   She is struggling to adjust to AL and anxious about many things, but has declined ACP referral.     By chart review, pt has hyperlipidemia, Vit D and B12 deficiency, SCC, hyperparathyroidism, OA and tendonitis, and osteopenia.   She was seen in [facility] in October 2023 following a fall off a stepstool   CT of head and neck did not show acute injury except for a small scalp hematoma.  No hospitalization in past 10 years          Past Medical History:   Diagnosis Date    Cancer (H)     UNKNOWN TYPE, NAIL BED    Depressive disorder, not elsewhere classified     Esophageal reflux     Gastroesophageal reflux    Impaired fasting glucose     Nonspecific elevation of levels of transaminase or lactic acid dehydrogenase (LDH)     elevated LFTs; now  normal    OSTEOPENIA 2000    osteopenia of hip and spine    Other and unspecified hyperlipidemia 10/02    Personal history of colonic polyps 2005    3 mm tubular adeoma       Past Surgical History:   Procedure Laterality Date    cataracts removed  2010    COLONOSCOPY  11/1/2011    Procedure:COLONOSCOPY; COLONOSCOPY; Surgeon:CHARITY DOWNEY; Location: GI    HC IMPLANTABLE BREAST PROSTHESIS  2003    redo implants    partial hysterectomy  age 38    [facility] NONSPECIFIC PROCEDURE  1977    hysterectomy-dysfunctional uterine bleeding    [facility] NONSPECIFIC PROCEDURE  1985    breast implants - bilateral    Z NONSPECIFIC PROCEDURE  6/00    pneumonia (inpatient at [facility])    Z NONSPECIFIC PROCEDURE      s/p varicose vein stripping    [facility] NONSPECIFIC PROCEDURE      s/p appy     SH:  Lives with  "her  [place]   Daughter Kinjal and son-in-law Amos in [place]   Sister Socorro Mon has 2 sons that live in the [facility].   Non smoker     ROS: [place]  Wt Readings from Last 5 Encounters:   03/19/24 66.3 kg (146 lb 1.6 oz)   02/26/24 64.2 kg (141 lb 9.6 oz)   02/13/24 64.2 kg (141 lb 9.6 oz)   01/30/24 65.2 kg (143 lb 12.8 oz)   06/28/15 78 kg (172 lb)      GENERAL APPEARANCE: alert and no distress  /54   Pulse 84   Temp 97.6  F (36.4  C)   Resp 18   Ht 1.651 m (5' 5\")   Wt 66.3 kg (146 lb 1.6 oz)   SpO2 96%   BMI 24.31 kg/m     HEENT: normocephalic, no lesion or abnormalities  NECK: no adenopathy, no asymmetry, masses, or scars and thyroid normal to palpation  RESP: lungs clear to auscultation - no rales, rhonchi or wheezes  CV: regular rate and rhythm, normal S1 S2  ABDOMEN:  soft, nontender, no HSM or masses and bowel sounds normal  MS: extremities normal- no gross deformities noted, no evidence of inflammation in joints, FROM in all extremities.  SKIN: no suspicious lesions or rashes  NEURO: Normal strength and tone, sensory exam grossly normal, and speech normal  PSYCH: affect okay  LYMPHATICS: No cervical,  or supraclavicular nodes     Lab Results   Component Value Date     02/20/2024    POTASSIUM 4.4 02/20/2024    CHLORIDE 105 02/20/2024    CO2 26 02/20/2024    ANIONGAP 9 02/20/2024    GLC 95 02/20/2024    BUN 17.0 02/20/2024    CR 0.98 (H) 02/20/2024    GFRESTIMATED 56 (L) 02/20/2024    APRIL 9.1 02/20/2024     Lab Results   Component Value Date    AST 19 02/20/2024      ALBUMIN 3.8 02/20/2024      ALKPHOS 104 02/20/2024     Lab Results   Component Value Date    WBC 6.7 02/20/2024      HGB 14.1 02/20/2024      MCV 89 02/20/2024       02/20/2024     Lab Results   Component Value Date    AST 19 02/20/2024     Component Value Date    ALBUMIN 3.8 02/20/2024      ALKPHOS 104 02/20/2024     TSH   Date Value Ref Range Status   02/20/2024 1.75 0.30 - 4.20 uIU/mL Final "   09/28/2012 1.49 0.4 - 5.0 mU/L Final      Lab Results   Component Value Date    CHOL 212 02/20/2024      HDL 46 02/20/2024       02/20/2024      TRIG 127 02/20/2024       IMP/PLAN:   (E55.9) Vitamin D deficiency   Comment: level in February was 21  Plan: declines prescribed vit D replacement     (E53.8) Vitamin B12 deficiency without anemia  Comment: level 229 in February   Plan: declines B12 replacement   Reviewed both of these at visit today     (M85.80) Osteopenia, unspecified location  Comment: DXA in 2014    Plan: could repeat DXA given low vit D, if pt agreeable       (E78.5) Hyperlipidemia LDL goal <130  Comment: as above, she used to take a statin     No documentation of CV event   Plan: recommend statin to pt     (I10) Primary hypertension  (N18.31) Stage 3a chronic kidney disease (H)  Comment:   BP Readings from Last 3 Encounters:   03/19/24 110/54   02/26/24 120/70   02/13/24 119/60      Plan: mild   Follow bps and BMP      Serene Hughes MD

## 2024-06-03 NOTE — PROGRESS NOTES
"[facility]  [place] Medical Record Number:  [id number]  Place of Service where encounter took place:  [facility] ([facility]) [[id number]]  Chief Complaint   Patient presents with    RECHECK       HPI:    Kimberley Fernandez  is a 83 year old (1940), who is being seen today for an episodic care visit.  HPI information obtained from: facility chart records, facility staff, patient report, and [facility] chart review. Today's concern is:    Follow up to chronic conditions. Resident had stopped taking all of her medications prior to admission to [place]. Lives with her  who has medication administration from facility so she is not able to keep pills in their apartment as he has dementia. Says \"I don't want my cost package to increase\". PMH includes SCC, GERD, Hyperlipidemia, Secondary Hyperparathyroidism, Depression, Osteopenia, B12 and D deficiency. Heart rate elevated today. Not in facility chart review. Increased additionally with ambulating around apartment 105-->120 bpm. Denies pain, chest pain or SOB. No recent Echo.     Past Medical and Surgical History reviewed in Epic today.    MEDICATIONS:    No current outpatient medications on file.       REVIEW OF SYSTEMS:  4 point ROS including Respiratory, CV, GI and , other than that noted in the HPI,  is negative    Objective:  /60   Pulse 105   Resp 16   Ht 1.651 m (5' 5\")   Wt 68 kg (150 lb)   SpO2 97%   BMI 24.96 kg/m    Exam:  GENERAL APPEARANCE:  Alert, anxious  ENT:  Mouth and posterior oropharynx normal, moist mucous membranes, [place]  EYES:  EOM, conjunctivae, lids, pupils and irises normal  RESP:  lungs clear to auscultation   CV:  Palpation and auscultation of heart done , tachy rate and rhythm, no murmur, rub, or gallop, trace edema  ABDOMEN:  no guarding or rebound  M/S:   without assistive device  SKIN:  limited but intact to visualized areas, dry legs, ankles and feet  NEURO:   Cranial nerves 2-12 are normal " tested and grossly at patient's baseline  PSYCH:  oriented X 3, anxious    Labs:   CBC RESULTS:   Recent Labs   Lab Test 02/20/24  1014   WBC 6.7   RBC 5.00   HGB 14.1   HCT 44.4   MCV 89   MCH 28.2   MCHC 31.8   RDW 14.8          Last Basic Metabolic Panel:  Recent Labs   Lab Test 02/20/24  1014      POTASSIUM 4.4   CHLORIDE 105   APRIL 9.1   CO2 26   BUN 17.0   CR 0.98*   GLC 95       Liver Function Studies -   Recent Labs   Lab Test 02/20/24  1014   PROTTOTAL 6.6   ALBUMIN 3.8   BILITOTAL 0.2   ALKPHOS 104   AST 19   ALT 14       TSH   Date Value Ref Range Status   02/20/2024 1.75 0.30 - 4.20 uIU/mL Final   09/28/2012 1.49 0.4 - 5.0 mU/L Final   05/14/2009 1.31 0.4 - 5.0 mU/L Final       Lab Results   Component Value Date    A1C 6.2 10/18/2006    A1C 6.3 10/14/2005     Parathyroid 91 prior was-->134-->110-->173    Recent Labs   Lab Test 02/20/24  1014   CHOL 212*   HDL 46*   *   TRIG 127     Vitamin B12:  229  Vitamin D:     <26    ASSESSMENT/PLAN:  (R00.0) Tachycardia, unspecified  Comment: ischemic demand v anxiety?  Plan:  -follow for now. Declining rate controlling medication today    (N25.81) Hyperparathyroidism, secondary (H24)  (primary encounter diagnosis)  (E55.9) Vitamin D deficiency  Comment: secondary to D deficiency  Plan:   -Vitamin D daily-will bring up slowly for better compliance. Refusing today  -follow labs    (E53.8) Vitamin B12 deficiency without anemia  Comment: long hx, prior use of B12 injections   Plan:   -vitamin B12 daily-refuses with cost of care  -follow labs    (E78.5) Hyperlipidemia LDL goal <130  Comment: prior use of statin. Not clear as to why stopped. Denies arthralgia, headaches, pain with use. Reviewed s/s and complications of increased lipids. No documented CV event   Plan:   -refuses to restart statin at this time but might be able to self administer from a lock box in apartment along with other medications     (M85.80) Osteopenia, unspecified  location  Comment: DXA in 2014    Plan:   -could repeat DXA given low vit D, if pt agreeable, not agreeable today    Electronically signed by:  BROOKE Toth CNP

## 2024-06-04 ENCOUNTER — ASSISTED LIVING VISIT (OUTPATIENT)
Dept: GERIATRICS | Facility: CLINIC | Age: 84
End: 2024-06-04
Payer: COMMERCIAL

## 2024-06-04 VITALS
HEIGHT: 65 IN | HEART RATE: 105 BPM | DIASTOLIC BLOOD PRESSURE: 60 MMHG | OXYGEN SATURATION: 97 % | RESPIRATION RATE: 16 BRPM | SYSTOLIC BLOOD PRESSURE: 123 MMHG | WEIGHT: 150 LBS | BODY MASS INDEX: 24.99 KG/M2

## 2024-06-04 DIAGNOSIS — E78.5 HYPERLIPIDEMIA LDL GOAL <130: ICD-10-CM

## 2024-06-04 DIAGNOSIS — E53.8 VITAMIN B12 DEFICIENCY WITHOUT ANEMIA: ICD-10-CM

## 2024-06-04 DIAGNOSIS — N25.81 HYPERPARATHYROIDISM, SECONDARY (H): ICD-10-CM

## 2024-06-04 DIAGNOSIS — E55.9 VITAMIN D DEFICIENCY: ICD-10-CM

## 2024-06-04 DIAGNOSIS — R00.0 TACHYCARDIA, UNSPECIFIED: Primary | ICD-10-CM

## 2024-06-04 PROCEDURE — 99349 HOME/RES VST EST MOD MDM 40: CPT | Performed by: NURSE PRACTITIONER

## 2024-06-04 NOTE — LETTER
"    6/4/2024        RE: Kimberley Edwards  [address] [place] [address]        [facility]  [facility] Medical Record Number:  [id number]  Place of Service where encounter took place:  [facility] ([facility]) [[id number]]  Chief Complaint   Patient presents with     RECHECK       HPI:    Kimberley Fernandez  is a 83 year old (1940), who is being seen today for an episodic care visit.  HPI information obtained from: facility chart records, facility staff, patient report, and [facility] chart review. Today's concern is:    Follow up to chronic conditions. Resident had stopped taking all of her medications prior to admission to [place]. Lives with her  who has medication administration from facility so she is not able to keep pills in their apartment as he has dementia. Says \"I don't want my cost package to increase\". PMH includes SCC, GERD, Hyperlipidemia, Secondary Hyperparathyroidism, Depression, Osteopenia, B12 and D deficiency. Heart rate elevated today. Not in facility chart review. Increased additionally with ambulating around apartment 105-->120 bpm. Denies pain, chest pain or SOB. No recent Echo.     Past Medical and Surgical History reviewed in Epic today.    MEDICATIONS:    No current outpatient medications on file.       REVIEW OF SYSTEMS:  4 point ROS including Respiratory, CV, GI and , other than that noted in the HPI,  is negative    Objective:  /60   Pulse 105   Resp 16   Ht 1.651 m (5' 5\")   Wt 68 kg (150 lb)   SpO2 97%   BMI 24.96 kg/m    Exam:  GENERAL APPEARANCE:  Alert, anxious  ENT:  Mouth and posterior oropharynx normal, moist mucous membranes, [place]  EYES:  EOM, conjunctivae, lids, pupils and irises normal  RESP:  lungs clear to auscultation   CV:  Palpation and auscultation of heart done , tachy rate and rhythm, no murmur, rub, or gallop, trace edema  ABDOMEN:  no guarding or rebound  M/S:   without assistive " device  SKIN:  limited but intact to visualized areas, dry legs, ankles and feet  NEURO:   Cranial nerves 2-12 are normal tested and grossly at patient's baseline  PSYCH:  oriented X 3, anxious    Labs:   CBC RESULTS:   Recent Labs   Lab Test 02/20/24  1014   WBC 6.7   RBC 5.00   HGB 14.1   HCT 44.4   MCV 89   MCH 28.2   MCHC 31.8   RDW 14.8          Last Basic Metabolic Panel:  Recent Labs   Lab Test 02/20/24  1014      POTASSIUM 4.4   CHLORIDE 105   APRIL 9.1   CO2 26   BUN 17.0   CR 0.98*   GLC 95       Liver Function Studies -   Recent Labs   Lab Test 02/20/24  1014   PROTTOTAL 6.6   ALBUMIN 3.8   BILITOTAL 0.2   ALKPHOS 104   AST 19   ALT 14       TSH   Date Value Ref Range Status   02/20/2024 1.75 0.30 - 4.20 uIU/mL Final   09/28/2012 1.49 0.4 - 5.0 mU/L Final   05/14/2009 1.31 0.4 - 5.0 mU/L Final       Lab Results   Component Value Date    A1C 6.2 10/18/2006    A1C 6.3 10/14/2005     Parathyroid 91 prior was-->134-->110-->173    Recent Labs   Lab Test 02/20/24  1014   CHOL 212*   HDL 46*   *   TRIG 127     Vitamin B12:  229  Vitamin D:     <26    ASSESSMENT/PLAN:  (R00.0) Tachycardia, unspecified  Comment: ischemic demand v anxiety?  Plan:  -follow for now. Declining rate controlling medication today    (N25.81) Hyperparathyroidism, secondary (H24)  (primary encounter diagnosis)  (E55.9) Vitamin D deficiency  Comment: secondary to D deficiency  Plan:   -Vitamin D daily-will bring up slowly for better compliance. Refusing today  -follow labs    (E53.8) Vitamin B12 deficiency without anemia  Comment: long hx, prior use of B12 injections   Plan:   -vitamin B12 daily-refuses with cost of care  -follow labs    (E78.5) Hyperlipidemia LDL goal <130  Comment: prior use of statin. Not clear as to why stopped. Denies arthralgia, headaches, pain with use. Reviewed s/s and complications of increased lipids. No documented CV event   Plan:   -refuses to restart statin at this time but might be able to  self administer from a lock box in apartment along with other medications     (M85.80) Osteopenia, unspecified location  Comment: DXA in 2014    Plan:   -could repeat DXA given low vit D, if pt agreeable, not agreeable today    Electronically signed by:  BROOKE Toth CNP             Sincerely,        BROOKE Toth CNP

## 2024-07-02 ENCOUNTER — ASSISTED LIVING VISIT (OUTPATIENT)
Dept: GERIATRICS | Facility: CLINIC | Age: 84
End: 2024-07-02
Payer: COMMERCIAL

## 2024-07-02 VITALS
SYSTOLIC BLOOD PRESSURE: 150 MMHG | OXYGEN SATURATION: 98 % | BODY MASS INDEX: 25.43 KG/M2 | HEART RATE: 87 BPM | RESPIRATION RATE: 16 BRPM | HEIGHT: 65 IN | DIASTOLIC BLOOD PRESSURE: 68 MMHG | WEIGHT: 152.6 LBS

## 2024-07-02 DIAGNOSIS — I10 PRIMARY HYPERTENSION: ICD-10-CM

## 2024-07-02 DIAGNOSIS — R63.5 WEIGHT GAIN: ICD-10-CM

## 2024-07-02 DIAGNOSIS — S81.812A NONINFECTED SKIN TEAR OF LEG, LEFT, INITIAL ENCOUNTER: Primary | ICD-10-CM

## 2024-07-02 PROCEDURE — 99349 HOME/RES VST EST MOD MDM 40: CPT | Performed by: NURSE PRACTITIONER

## 2024-07-02 NOTE — LETTER
" 7/2/2024      Kimberley Edwards  [id number] [place] [address]        [facility]  [facility] Record Number:  [id number]  Place of Service where encounter took place:  [facility] ([facility]) [[id number]]  Chief Complaint   Patient presents with     Assisted Living Acute     Skin tear       HPI:    Kimberley Fernandez  is a 83 year old (1940), who is being seen today for an episodic care visit.  HPI information obtained from: facility chart records, facility staff, patient report, and [facility] chart review. Today's concern is:    Sustained a skin tear on 6/28 to left lower extremity with a bump into her 's WC. Denies pain.     Past Medical and Surgical History reviewed in Epic today.    MEDICATIONS:    No current outpatient medications on file.     REVIEW OF SYSTEMS:  4 point ROS including Respiratory, CV, GI and , other than that noted in the HPI,  is negative    Objective:  BP (!) 150/68   Pulse 87   Resp 16   Ht 1.651 m (5' 5\")   Wt 69.2 kg (152 lb 9.6 oz)   SpO2 98%   BMI 25.39 kg/m    Exam:  GENERAL APPEARANCE:  Alert, less anxious  ENT:  Mouth and posterior oropharynx normal, moist mucous membranes, [place]  EYES:  EOM, conjunctivae, lids, pupils and irises normal  RESP:  lungs clear to auscultation   CV:  Palpation and auscultation of heart done , regular rate and rhythm, no murmur, rub, or gallop, trace edema  ABDOMEN:  no guarding or rebound  M/S:   without assistive device  SKIN: dry legs, ankles and feet, LLE skin tear, reddened wound base, no surrounding swelling, warmth, 3cm, irregular boarders, dusky skin flap, moist band aid in place  NEURO:   Cranial nerves 2-12 are normal tested and grossly at patient's baseline  PSYCH:  oriented X 3    Labs:   CBC RESULTS:   Recent Labs   Lab Test 02/20/24  1014   WBC 6.7   RBC 5.00   HGB 14.1   HCT 44.4   MCV 89   MCH 28.2   MCHC 31.8   RDW 14.8          Last Basic Metabolic " "Panel:  Recent Labs   Lab Test 02/20/24  1014      POTASSIUM 4.4   CHLORIDE 105   APRIL 9.1   CO2 26   BUN 17.0   CR 0.98*   GLC 95       Liver Function Studies -   Recent Labs   Lab Test 02/20/24  1014   PROTTOTAL 6.6   ALBUMIN 3.8   BILITOTAL 0.2   ALKPHOS 104   AST 19   ALT 14       TSH   Date Value Ref Range Status   02/20/2024 1.75 0.30 - 4.20 uIU/mL Final   09/28/2012 1.49 0.4 - 5.0 mU/L Final   05/14/2009 1.31 0.4 - 5.0 mU/L Final       Lab Results   Component Value Date    A1C 6.2 10/18/2006    A1C 6.3 10/14/2005     ASSESSMENT/PLAN:  (S81.729B) Noninfected skin tear of leg, left, initial encounter  (primary encounter diagnosis)  Comment: healing  Plan:   -educated s/s of infection  -continue daily wound care until resolved   -will notify nursing staff with any s/s of infection    (I10) Primary hypertension  Comment: refuses medication   Plan:   -monitor BP/HR with visits     (R63.5) Weight gain  Comment: slow weight gain since admission. No s/s of fluid overload  Plan:   -monthly weights       Electronically signed by:  BROOKE Toth [facility]    Chief Complaint   Patient presents with     Assisted Living Acute     Skin tear     HPI:    Kimberley Fernandez is a 83 year old  (1940), who is being seen today for an episodic care visit at: [facility]  [facility] ([facility]) [[id number]]  Today's concern is:  3cm skin tear on left shin obtained from bumping into 's wheelchair. Bandage removed, appears moist, bloody with skin flap overlying. Denies pain, no surrounding area redness or swelling. Had been applying bandages to area.    PMH/PSH reviewed in EPIC today    BP (!) 150/68   Pulse 87   Resp 16   Ht 1.651 m (5' 5\")   Wt 69.2 kg (152 lb 9.6 oz)   SpO2 98%   BMI 25.39 kg/m    GENERAL APPEARANCE:  Alert, in no distress    Assessment/Plan:    Skin tear-   Irrigated site with normal saline, dried, and reapplied bandage.   Discussed with patient to " report signs of infection- redness, swelling, increase pain to nurse. No signs of infection at this time.        Electronically signed by:  Nova Momin       Sincerely,        BROOKE Toth CNP

## 2024-07-02 NOTE — PROGRESS NOTES
"[facility]  [facility] Medical Record Number:  [id number]  Place of Service where encounter took place:  [facility] ([facility]) [[id number]]  Chief Complaint   Patient presents with    Assisted Living Acute     Skin tear       HPI:    Kimberley Fernandez  is a 83 year old (1940), who is being seen today for an episodic care visit.  HPI information obtained from: facility chart records, facility staff, patient report, and [facility] chart review. Today's concern is:    Sustained a skin tear on 6/28 to left lower extremity with a bump into her 's WC. Denies pain.     Past Medical and Surgical History reviewed in Epic today.    MEDICATIONS:    No current outpatient medications on file.     REVIEW OF SYSTEMS:  4 point ROS including Respiratory, CV, GI and , other than that noted in the HPI,  is negative    Objective:  BP (!) 150/68   Pulse 87   Resp 16   Ht 1.651 m (5' 5\")   Wt 69.2 kg (152 lb 9.6 oz)   SpO2 98%   BMI 25.39 kg/m    Exam:  GENERAL APPEARANCE:  Alert, less anxious  ENT:  Mouth and posterior oropharynx normal, moist mucous membranes, [place]  EYES:  EOM, conjunctivae, lids, pupils and irises normal  RESP:  lungs clear to auscultation   CV:  Palpation and auscultation of heart done , regular rate and rhythm, no murmur, rub, or gallop, trace edema  ABDOMEN:  no guarding or rebound  M/S:   without assistive device  SKIN: dry legs, ankles and feet, LLE skin tear, reddened wound base, no surrounding swelling, warmth, 3cm, irregular boarders, dusky skin flap, moist band aid in place  NEURO:   Cranial nerves 2-12 are normal tested and grossly at patient's baseline  PSYCH:  oriented X 3    Labs:   CBC RESULTS:   Recent Labs   Lab Test 02/20/24  1014   WBC 6.7   RBC 5.00   HGB 14.1   HCT 44.4   MCV 89   MCH 28.2   MCHC 31.8   RDW 14.8          Last Basic Metabolic Panel:  Recent Labs   Lab Test 02/20/24  1014      POTASSIUM 4.4   CHLORIDE 105   APRIL 9.1   CO2 " 26   BUN 17.0   CR 0.98*   GLC 95       Liver Function Studies -   Recent Labs   Lab Test 02/20/24  1014   PROTTOTAL 6.6   ALBUMIN 3.8   BILITOTAL 0.2   ALKPHOS 104   AST 19   ALT 14       TSH   Date Value Ref Range Status   02/20/2024 1.75 0.30 - 4.20 uIU/mL Final   09/28/2012 1.49 0.4 - 5.0 mU/L Final   05/14/2009 1.31 0.4 - 5.0 mU/L Final       Lab Results   Component Value Date    A1C 6.2 10/18/2006    A1C 6.3 10/14/2005     ASSESSMENT/PLAN:  (S88.245B) Noninfected skin tear of leg, left, initial encounter  (primary encounter diagnosis)  Comment: healing  Plan:   -educated s/s of infection  -continue daily wound care until resolved   -will notify nursing staff with any s/s of infection    (I10) Primary hypertension  Comment: refuses medication   Plan:   -monitor BP/HR with visits     (R63.5) Weight gain  Comment: slow weight gain since admission. No s/s of fluid overload  Plan:   -monthly weights       Electronically signed by:  BROOKE Toth CNP

## 2024-07-03 NOTE — PROGRESS NOTES
"[facility]    Chief Complaint   Patient presents with    Assisted Living Acute     Skin tear     HPI:    Kimberley Fernandez is a 83 year old  (1940), who is being seen today for an episodic care visit at: [facility]  [facility] ([facility]) [[id number]]  Today's concern is:  3cm skin tear on left shin obtained from bumping into 's wheelchair. Bandage removed, appears moist, bloody with skin flap overlying. Denies pain, no surrounding area redness or swelling. Had been applying bandages to area.    PMH/PSH reviewed in EPIC today    BP (!) 150/68   Pulse 87   Resp 16   Ht 1.651 m (5' 5\")   Wt 69.2 kg (152 lb 9.6 oz)   SpO2 98%   BMI 25.39 kg/m    GENERAL APPEARANCE:  Alert, in no distress    Assessment/Plan:    Skin tear-   Irrigated site with normal saline, dried, and reapplied bandage.   Discussed with patient to report signs of infection- redness, swelling, increase pain to nurse. No signs of infection at this time.        Electronically signed by:  Nova Momin   "

## 2024-10-01 ENCOUNTER — ASSISTED LIVING VISIT (OUTPATIENT)
Dept: GERIATRICS | Facility: CLINIC | Age: 84
End: 2024-10-01
Payer: COMMERCIAL

## 2024-10-01 VITALS
HEIGHT: 65 IN | WEIGHT: 155.8 LBS | BODY MASS INDEX: 25.96 KG/M2 | SYSTOLIC BLOOD PRESSURE: 132 MMHG | HEART RATE: 96 BPM | DIASTOLIC BLOOD PRESSURE: 78 MMHG | RESPIRATION RATE: 16 BRPM | OXYGEN SATURATION: 99 %

## 2024-10-01 DIAGNOSIS — M25.512 CHRONIC LEFT SHOULDER PAIN: ICD-10-CM

## 2024-10-01 DIAGNOSIS — R68.89 FORGETFULNESS: ICD-10-CM

## 2024-10-01 DIAGNOSIS — N25.81 HYPERPARATHYROIDISM, SECONDARY (H): ICD-10-CM

## 2024-10-01 DIAGNOSIS — G89.29 CHRONIC LEFT SHOULDER PAIN: ICD-10-CM

## 2024-10-01 DIAGNOSIS — M85.80 OSTEOPENIA, UNSPECIFIED LOCATION: ICD-10-CM

## 2024-10-01 DIAGNOSIS — E78.5 HYPERLIPIDEMIA LDL GOAL <130: ICD-10-CM

## 2024-10-01 DIAGNOSIS — L98.9 SKIN LESION: ICD-10-CM

## 2024-10-01 DIAGNOSIS — F41.9 ANXIETY: ICD-10-CM

## 2024-10-01 DIAGNOSIS — I10 PRIMARY HYPERTENSION: Primary | ICD-10-CM

## 2024-10-01 PROCEDURE — 99349 HOME/RES VST EST MOD MDM 40: CPT | Performed by: NURSE PRACTITIONER

## 2024-10-01 NOTE — PROGRESS NOTES
"[facility]      [facility] Medical Record Number:  [id number]  Place of Service where encounter took place:  [facility] ([facility]) [[id number]]    HPI:    Kimberley Fernandez is a 83 year old  (1940), who is being seen today for an episodic care visit.  HPI information obtained from: patient report. Seen today for annual visit.  Today's concern is: none.      ALLERGIES: No known drug allergy  Past Medical, Surgical, Family and Social History reviewed and updated in [facility].    No current outpatient medications on file.     Medications reviewed:  Medications reconciled to facility chart and changes were made to reflect current medications as identified as above med list. Below are the changes that were made:   Medications stopped since last EPIC medication reconciliation:   There are no discontinued medications.    Medications started since last [facility] medication reconciliation:  No orders of the defined types were placed in this encounter.        REVIEW OF SYSTEMS:  10 point ROS of systems including Constitutional, Eyes, Respiratory, Cardiovascular, Gastroenterology, Genitourinary, Integumentary, Musculoskeletal, Psychiatric were all negative except for pertinent positives noted in my HPI.    Physical Exam:  Ht 1.651 m (5' 5\")   Wt 70.7 kg (155 lb 12.8 oz)   BMI 25.93 kg/m    GENERAL APPEARANCE:  Alert, in no distress, anxious, forgetful  CV:  Palpation and auscultation of heart done , regular rate and rhythm, no murmur, rub, or gallop, no edema, rate-normal  ABDOMEN:  normal bowel sounds, soft, nontender, no hepatosplenomegaly or other masses, bowel sounds normal  M/S:   Gait and station normal  SKIN:  Inspection of skin and subcutaneous tissue baseline, keratoses - actinic R outer forearm       CBC RESULTS:   Recent Labs   Lab Test 02/20/24  1014   WBC 6.7   RBC 5.00   HGB 14.1   HCT 44.4   MCV 89   MCH 28.2   MCHC 31.8   RDW 14.8          Last Basic Metabolic " Panel:  Recent Labs   Lab Test 02/20/24  1014      POTASSIUM 4.4   CHLORIDE 105   APRIL 9.1   CO2 26   BUN 17.0   CR 0.98*   GLC 95       Liver Function Studies -   Recent Labs   Lab Test 02/20/24  1014   PROTTOTAL 6.6   ALBUMIN 3.8   BILITOTAL 0.2   ALKPHOS 104   AST 19   ALT 14       TSH   Date Value Ref Range Status   02/20/2024 1.75 0.30 - 4.20 uIU/mL Final   09/28/2012 1.49 0.4 - 5.0 mU/L Final   05/14/2009 1.31 0.4 - 5.0 mU/L Final   ]    Lab Results   Component Value Date    A1C 6.2 10/18/2006    A1C 6.3 10/14/2005         Assessment/Plan:    Osteoporosis r/t vitamin D and B12 deficiency  -Discussed vitamin D and B12 supp, discussed lorenzo of falls and fractures, refuses medication  - Recommend DXA scan, refuses     Hypertension  -Refuses medication, discussed risks and benefits  -continue to monitor BP and HR    Hyperlipidemia  -   -Goal LDL <130  -refuses medications, discussed risks and benefits        Electronically signed by  BROOKE Toth CNP

## 2024-10-01 NOTE — PROGRESS NOTES
[facility]  Chief Complaint   Patient presents with    Annual Comprehensive Exam Assisted Living     [facility] Medical Record Number:  [id number]  Place of Service where encounter took place:  [facility] ([facility] [[id number]]    HPI:    Kimberley Fernandez  is a 83 year old  (1940), who is being seen today for an annual comprehensive visit. HPI information obtained from: facility chart records, facility staff, patient report, and [facility] chart review.  Today's concerns are:    Seen today for follow up to chronic disease management. She is reviewing bills from facility and visits. Seems a bit confused. Thinks her insurance won't cover from [facility]. Does not remember Dr. Hughes and [facility]. Is fixated on a clinic visit. She did see a [facility] clinic provider for shoulder pain and injection in August. Daughter says that is clinic she would typically go to prior to moving into [place].  Weight is slightly up since admission, not taking any medications. PMH includes SCC, GERD, Hyperlipidemia, Secondary Hyperparathyroidism, Depression, Osteopenia, B12 and D deficiency. Reviewed previous visit notes, reviewed Dr. Carrington note to help with reorientation.       ALLERGIES: No known drug allergy  PAST MEDICAL HISTORY:  has a past medical history of Cancer (H), Depressive disorder, not elsewhere classified, Esophageal reflux, Impaired fasting glucose, Nonspecific elevation of levels of transaminase or lactic acid dehydrogenase (LDH), OSTEOPENIA (2000), Other and unspecified hyperlipidemia (10/02), and Personal history of colonic polyps (2005).  PAST SURGICAL HISTORY:  has a past surgical history that includes NONSPECIFIC PROCEDURE (1977); NONSPECIFIC PROCEDURE (1985); NONSPECIFIC PROCEDURE (6/00); NONSPECIFIC PROCEDURE; NONSPECIFIC PROCEDURE; partial hysterectomy (age 38); cataracts removed (2010); IMPLANTABLE BREAST PROSTHESIS (2003); and Colonoscopy  "(11/1/2011).  IMMUNIZATIONS:  Immunization History   Administered Date(s) Administered    COVID-19 Bivalent 12+ (Pfizer) 10/27/2022    COVID-19 MONOVALENT 12+ (Pfizer) 03/04/2021, 03/25/2021, 01/05/2022    Influenza (IIV3) PF 10/14/2005, 10/18/2006    Pneumococcal 23 valent 07/08/2004    TD,PF 7+ (Tenivac) 10/08/2003, 10/01/2014    TDAP (Adacel,Boostrix) 10/29/2023     Above immunizations pulled from [facility]. [facility] and facility records also reconciled. Outstanding information sent to  to update [facility].  Future immunizations needed:  PCV13 and Shingrex     No current outpatient medications on file.       Case Management:  I have reviewed the Assisted Living care plan, current immunizations and preventive care/cancer screening. . Refused   Patient's desire to return to the community is present, but is not able due to care needs . Current Level of Care is appropriate.    Advance Directive Discussion:    I reviewed the current advanced directives as reflected in EPIC, the POLST and the facility chart, and verified the congruency of orders. I contacted the first party Gracie  and left a message regarding the plan of Care.  I did review the advance directives with the resident.     Team Discussion:  I communicated with the appropriate disciplines involved with the Plan of Care:   Nursing    Patient's goal is Full Code.  Information reviewed:  Medications, vital signs, orders, and nursing notes.    ROS:  4 point ROS including Respiratory, CV, GI and , other than that noted in the HPI,  is negative    Vitals:  /78   Pulse 96   Resp 16   Ht 1.651 m (5' 5\")   Wt 70.7 kg (155 lb 12.8 oz)   SpO2 99%   BMI 25.93 kg/m   Body mass index is 25.93 kg/m .  Exam:  GENERAL APPEARANCE:  Alert, anxious  ENT:  Mouth and posterior oropharynx normal, moist mucous membranes, [place]-ears clear of wax  EYES:  EOM, conjunctivae, lids, pupils and irises normal  RESP:  lungs clear to auscultation   CV:  " Palpation and auscultation of heart done , regular rate and rhythm, no murmur, rub, or gallop, trace edema  ABDOMEN:  no guarding or rebound  M/S:   without assistive device  SKIN:  limited but intact to visualized areas   NEURO:   Cranial nerves 2-12 are normal tested and grossly at patient's baseline  PSYCH:  oriented X 3, anxious    Lab/Diagnostic data:   CBC RESULTS:   Recent Labs   Lab Test 02/20/24  1014   WBC 6.7   RBC 5.00   HGB 14.1   HCT 44.4   MCV 89   MCH 28.2   MCHC 31.8   RDW 14.8          Last Basic Metabolic Panel:  Recent Labs   Lab Test 02/20/24  1014      POTASSIUM 4.4   CHLORIDE 105   APRIL 9.1   CO2 26   BUN 17.0   CR 0.98*   GLC 95       Liver Function Studies -   Recent Labs   Lab Test 02/20/24  1014   PROTTOTAL 6.6   ALBUMIN 3.8   BILITOTAL 0.2   ALKPHOS 104   AST 19   ALT 14       TSH   Date Value Ref Range Status   02/20/2024 1.75 0.30 - 4.20 uIU/mL Final   09/28/2012 1.49 0.4 - 5.0 mU/L Final   05/14/2009 1.31 0.4 - 5.0 mU/L Final       Lab Results   Component Value Date    A1C 6.2 10/18/2006    A1C 6.3 10/14/2005     ASSESSMENT/PLAN  (I10) Primary hypertension  (primary encounter diagnosis)  Comment: stable   Plan:   -HR elevates at times    (N25.81) Hyperparathyroidism, secondary (H)  Comment: by history has seen endocrinology in past noted indicate could be related to vitamin D deficiency   Plan:   -refuses supplement     (E78.5) Hyperlipidemia LDL goal <130  Comment: per labs  Plan:   -refuses statin     (M85.80) Osteopenia, unspecified location  Comment: ongoing   Plan:   -refuses Dexa   -refuses d supplement     (F41.9) Anxiety  (R68.89) Forgetfulness  Comment: STML  Plan:   -seems a bit paranoid and overwhelmed with attempting to manage bills     (L98.9) Skin lesion  Comment: on exam-hx of SCC  Plan:   -may decide to follow up with derm     (M25.512, G89.29) chronic left shoulder pain  Comment: ongoing  Plan:  -shoulder injections onsite or in clinic q4 months prn        Electronically signed by:  BROOKE Toth CNP

## 2024-10-01 NOTE — LETTER
10/1/2024      Kimberley Edwards  [address] [place] [address]        [place] [facility]  Chief Complaint   Patient presents with     Annual Comprehensive Exam Assisted Living     [place] Medical Record Number:  [id number]  Place of Service where encounter took place:  [facility] [[id number]]    HPI:    Kimberley Fernandez  is a 83 year old  (1940), who is being seen today for an annual comprehensive visit. HPI information obtained from: facility chart records, facility staff, patient report, and [facility] chart review.  Today's concerns are:    Seen today for follow up to chronic disease management. She is reviewing bills from facility and visits. Seems a bit confused. Thinks her insurance won't cover from [facility]. Does not remember Dr. Hughes and [place] group. Is fixated on a clinic visit. Weight is slightly up since admission, not taking any medications. PMH includes SCC, GERD, Hyperlipidemia, Secondary Hyperparathyroidism, Depression, Osteopenia, B12 and D deficiency. Reviewed previous visit notes, reviewed Dr. Carrington note to help with reorientation.       ALLERGIES: No known drug allergy  PAST MEDICAL HISTORY:  has a past medical history of Cancer (H), Depressive disorder, not elsewhere classified, Esophageal reflux, Impaired fasting glucose, Nonspecific elevation of levels of transaminase or lactic acid dehydrogenase (LDH), OSTEOPENIA (2000), Other and unspecified hyperlipidemia (10/02), and Personal history of colonic polyps (2005).  PAST SURGICAL HISTORY:  has a past surgical history that includes NONSPECIFIC PROCEDURE (1977); NONSPECIFIC PROCEDURE (1985); NONSPECIFIC PROCEDURE (6/00); NONSPECIFIC PROCEDURE; NONSPECIFIC PROCEDURE; partial hysterectomy (age 38); cataracts removed (2010); IMPLANTABLE BREAST PROSTHESIS (2003); and Colonoscopy (11/1/2011).  IMMUNIZATIONS:  Immunization History   Administered Date(s) Administered      "COVID-19 Bivalent 12+ (Pfizer) 10/27/2022     COVID-19 MONOVALENT 12+ (Pfizer) 03/04/2021, 03/25/2021, 01/05/2022     Influenza (IIV3) PF 10/14/2005, 10/18/2006     Pneumococcal 23 valent 07/08/2004     TD,PF 7+ (Tenivac) 10/08/2003, 10/01/2014     TDAP (Adacel,Boostrix) 10/29/2023     Above immunizations pulled from [facility]. [facility] and facility records also reconciled. Outstanding information sent to  to update [facility].  Future immunizations needed:  PCV13 and Shingrex     No current outpatient medications on file.       Case Management:  I have reviewed the Assisted Living care plan, current immunizations and preventive care/cancer screening. . Refused   Patient's desire to return to the community is present, but is not able due to care needs . Current Level of Care is appropriate.    Advance Directive Discussion:    I reviewed the current advanced directives as reflected in EPIC, the POLST and the facility chart, and verified the congruency of orders. I contacted the first party Gracie  and left a message regarding the plan of Care.  I did review the advance directives with the resident.     Team Discussion:  I communicated with the appropriate disciplines involved with the Plan of Care:   Nursing    Patient's goal is Full Code.  Information reviewed:  Medications, vital signs, orders, and nursing notes.    ROS:  4 point ROS including Respiratory, CV, GI and , other than that noted in the HPI,  is negative    Vitals:  /78   Pulse 96   Resp 16   Ht 1.651 m (5' 5\")   Wt 70.7 kg (155 lb 12.8 oz)   SpO2 99%   BMI 25.93 kg/m   Body mass index is 25.93 kg/m .  Exam:  GENERAL APPEARANCE:  Alert, anxious  ENT:  Mouth and posterior oropharynx normal, moist mucous membranes, [place]-ears clear of wax  EYES:  EOM, conjunctivae, lids, pupils and irises normal  RESP:  lungs clear to auscultation   CV:  Palpation and auscultation of heart done , regular rate and rhythm, no murmur, rub, or " gallop, trace edema  ABDOMEN:  no guarding or rebound  M/S:   without assistive device  SKIN:  limited but intact to visualized areas   NEURO:   Cranial nerves 2-12 are normal tested and grossly at patient's baseline  PSYCH:  oriented X 3, anxious    Lab/Diagnostic data:   CBC RESULTS:   Recent Labs   Lab Test 02/20/24  1014   WBC 6.7   RBC 5.00   HGB 14.1   HCT 44.4   MCV 89   MCH 28.2   MCHC 31.8   RDW 14.8          Last Basic Metabolic Panel:  Recent Labs   Lab Test 02/20/24  1014      POTASSIUM 4.4   CHLORIDE 105   APRIL 9.1   CO2 26   BUN 17.0   CR 0.98*   GLC 95       Liver Function Studies -   Recent Labs   Lab Test 02/20/24  1014   PROTTOTAL 6.6   ALBUMIN 3.8   BILITOTAL 0.2   ALKPHOS 104   AST 19   ALT 14       TSH   Date Value Ref Range Status   02/20/2024 1.75 0.30 - 4.20 uIU/mL Final   09/28/2012 1.49 0.4 - 5.0 mU/L Final   05/14/2009 1.31 0.4 - 5.0 mU/L Final       Lab Results   Component Value Date    A1C 6.2 10/18/2006    A1C 6.3 10/14/2005     ASSESSMENT/PLAN  (I10) Primary hypertension  (primary encounter diagnosis)  Comment: stable   Plan:   -HR elevates at times    (N25.81) Hyperparathyroidism, secondary (H)  Comment: by history has seen endocrinology in past noted indicate could be related to vitamin D deficiency   Plan:   -refuses supplement     (E78.5) Hyperlipidemia LDL goal <130  Comment: per labs  Plan:   -refuses statin     (M85.80) Osteopenia, unspecified location  Comment: ongoing   Plan:   -refuses Dexa   -refuses d supplement     (F41.9) Anxiety  (R68.89) Forgetfulness  Comment: STML  Plan:   -seems a bit paranoid and overwhelmed with attempting to manage bills     (L98.9) Skin lesion  Comment: on exam-hx of SCC  Plan:   -may decide to follow up with derm           Electronically signed by:  BROOKE Toth CNP         [facility] GERIATRIC [facility]      [facility] Medical Record Number:  [id number]  Place of Service where encounter took place[facility]  [facility] [place] [facility] [[id number]]    HPI:    Kimberley Fernandez is a 83 year old  (1940), who is being seen today for an episodic care visit.  HPI information obtained from: patient report. Seen today for annual visit.  Today's concern is: none.      ALLERGIES: No known drug allergy  Past Medical, Surgical, Family and Social History reviewed and updated in [facility].    No current outpatient medications on file.     Medications reviewed:  Medications reconciled to facility chart and changes were made to reflect current medications as identified as above med list. Below are the changes that were made:   Medications stopped since last EPIC medication reconciliation:   There are no discontinued medications.    Medications started since last [facility] medication reconciliation:  No orders of the defined types were placed in this encounter.        REVIEW OF SYSTEMS:  10 point ROS of systems including Constitutional, Eyes, Respiratory, Cardiovascular, Gastroenterology, Genitourinary, Integumentary, Musculoskeletal, Psychiatric were all negative except for pertinent positives noted in my HPI.    Physical Exam:  Ht 1.651 m (5' 5\")   Wt 70.7 kg (155 lb 12.8 oz)   BMI 25.93 kg/m    GENERAL APPEARANCE:  Alert, in no distress, anxious, forgetful  CV:  Palpation and auscultation of heart done , regular rate and rhythm, no murmur, rub, or gallop, no edema, rate-normal  ABDOMEN:  normal bowel sounds, soft, nontender, no hepatosplenomegaly or other masses, bowel sounds normal  M/S:   Gait and station normal  SKIN:  Inspection of skin and subcutaneous tissue baseline, keratoses - actinic R outer forearm       CBC RESULTS:   Recent Labs   Lab Test 02/20/24  1014   WBC 6.7   RBC 5.00   HGB 14.1   HCT 44.4   MCV 89   MCH 28.2   MCHC 31.8   RDW 14.8          Last Basic Metabolic Panel:  Recent Labs   Lab Test 02/20/24  1014      POTASSIUM 4.4   CHLORIDE 105   APRIL 9.1   CO2 26   BUN 17.0   CR 0.98*   GLC 95       Liver " Function Studies -   Recent Labs   Lab Test 02/20/24  1014   PROTTOTAL 6.6   ALBUMIN 3.8   BILITOTAL 0.2   ALKPHOS 104   AST 19   ALT 14       TSH   Date Value Ref Range Status   02/20/2024 1.75 0.30 - 4.20 uIU/mL Final   09/28/2012 1.49 0.4 - 5.0 mU/L Final   05/14/2009 1.31 0.4 - 5.0 mU/L Final   ]    Lab Results   Component Value Date    A1C 6.2 10/18/2006    A1C 6.3 10/14/2005         Assessment/Plan:    Osteoporosis r/t vitamin D and B12 deficiency  -Discussed vitamin D and B12 supp, discussed lorenzo of falls and fractures, refuses medication  - Recommend DXA scan, refuses     Hypertension  -Refuses medication, discussed risks and benefits  -continue to monitor BP and HR    Hyperlipidemia  -   -Goal LDL <130  -refuses medications, discussed risks and benefits        Electronically signed by  Nova Momin       Sincerely,        BROOKE Toth CNP

## 2025-01-13 NOTE — PROGRESS NOTES
"[place] [facility]  [place] [facility] Record Number:  [id number]  Place of Service where encounter took place:  [facility] ([facility]) [[id number]]  Chief Complaint   Patient presents with    RECHECK       HPI:    Kimberley Fernandez  is a 84 year old (1940), who is being seen today for an episodic care visit.  HPI information obtained from: facility chart records, facility staff, patient report, and [facility] chart review. Today's concern is:    Seen today for follow up URI. She is upset that her medication never arrived and reports to spending 5 days in bed. She is feeling better today. No cough, SOB, no congestion.     Continues to have a elevated HR. She denies feeling racing heart, chest pain. PMH includes SCC, GERD, Hyperlipidemia, Secondary Hyperparathyroidism, Depression, Osteopenia, B12 and D deficiency. She does not want to take any medications for chronic disease management. Her memory loss is progressing.     Past Medical and Surgical History reviewed in Epic today.    MEDICATIONS:    No current outpatient medications on file.     REVIEW OF SYSTEMS:  4 point ROS including Respiratory, CV, GI and , other than that noted in the HPI,  is negative    Objective:  /64   Pulse 80   Temp 97.4  F (36.3  C)   Resp 22   Ht 1.651 m (5' 5\")   Wt 72.6 kg (160 lb)   SpO2 96%   BMI 26.63 kg/m    EGENERAL APPEARANCE:  Alert, guarded  ENT:  Mouth and posterior oropharynx normal, moist mucous membranes, [place]-reads lips  EYES:  EOM, conjunctivae, lids, pupils and irises normal  RESP:  lungs clear to auscultation   CV:  Palpation and auscultation of heart done , tachy rate and regular rhythm, no murmur, rub, or gallop, trace edema  ABDOMEN:  no guarding or rebound  M/S:   without assistive device  SKIN:  limited but intact to visualized areas   NEURO:   Cranial nerves 2-12 are normal tested and grossly at patient's baseline  PSYCH:  oriented X 3, anxiousxam:      Labs:   CBC RESULTS: "   Recent Labs   Lab Test 02/20/24  1014   WBC 6.7   RBC 5.00   HGB 14.1   HCT 44.4   MCV 89   MCH 28.2   MCHC 31.8   RDW 14.8          Last Basic Metabolic Panel:  Recent Labs   Lab Test 02/20/24  1014      POTASSIUM 4.4   CHLORIDE 105   APRIL 9.1   CO2 26   BUN 17.0   CR 0.98*   GLC 95       Liver Function Studies -   Recent Labs   Lab Test 02/20/24  1014   PROTTOTAL 6.6   ALBUMIN 3.8   BILITOTAL 0.2   ALKPHOS 104   AST 19   ALT 14       TSH   Date Value Ref Range Status   02/20/2024 1.75 0.30 - 4.20 uIU/mL Final   09/28/2012 1.49 0.4 - 5.0 mU/L Final   05/14/2009 1.31 0.4 - 5.0 mU/L Final       Lab Results   Component Value Date    A1C 6.2 10/18/2006    A1C 6.3 10/14/2005     ASSESSMENT/PLAN:  (J06.9) URI with cough and congestion  Comment: resolved  Plan:   -monitor for any additional s/s     (I10) Primary hypertension  (primary encounter diagnosis)  Comment: stable   Plan:   -HR elevates at times     (N25.81) Hyperparathyroidism, secondary (H)  Comment: by history has seen endocrinology in past noted indicate could be related to vitamin D deficiency   Plan:   -refuses supplement      (E78.5) Hyperlipidemia LDL goal <130  Comment: per labs  Plan:   -refuses statin      (M85.80) Osteopenia, unspecified location  Comment: ongoing   Plan:   -refuses Dexa   -refuses d supplement      (F41.9) Anxiety  (R68.89) Forgetfulness  Comment: STML  Plan:   -seems a bit paranoid and overwhelmed      (L98.9) Skin lesion  Comment: on exam-hx of SCC  Plan:   -may decide to follow up with derm      (M25.512, G89.29) chronic left shoulder pain  Comment: ongoing  Plan:  -shoulder injections onsite or in clinic q4 months prn      (N18.31) Stage 3a chronic kidney disease (H)  Comment: chronic   Plan:   -follow BMP and CBC            Electronically signed by:  Yobani Kelly, BROOKE CNP

## 2025-01-14 ENCOUNTER — ASSISTED LIVING VISIT (OUTPATIENT)
Dept: GERIATRICS | Facility: CLINIC | Age: 85
End: 2025-01-14
Payer: COMMERCIAL

## 2025-01-14 VITALS
TEMPERATURE: 97.4 F | OXYGEN SATURATION: 96 % | RESPIRATION RATE: 16 BRPM | SYSTOLIC BLOOD PRESSURE: 143 MMHG | HEART RATE: 108 BPM | HEIGHT: 65 IN | WEIGHT: 160 LBS | DIASTOLIC BLOOD PRESSURE: 70 MMHG | BODY MASS INDEX: 26.66 KG/M2

## 2025-01-14 DIAGNOSIS — G89.29 CHRONIC LEFT SHOULDER PAIN: ICD-10-CM

## 2025-01-14 DIAGNOSIS — J06.9 URI WITH COUGH AND CONGESTION: Primary | ICD-10-CM

## 2025-01-14 DIAGNOSIS — L98.9 SKIN LESION: ICD-10-CM

## 2025-01-14 DIAGNOSIS — N18.31 STAGE 3A CHRONIC KIDNEY DISEASE (H): ICD-10-CM

## 2025-01-14 DIAGNOSIS — N25.81 HYPERPARATHYROIDISM, SECONDARY: ICD-10-CM

## 2025-01-14 DIAGNOSIS — M25.512 CHRONIC LEFT SHOULDER PAIN: ICD-10-CM

## 2025-01-14 DIAGNOSIS — M85.80 OSTEOPENIA, UNSPECIFIED LOCATION: ICD-10-CM

## 2025-01-14 DIAGNOSIS — I10 PRIMARY HYPERTENSION: ICD-10-CM

## 2025-01-14 DIAGNOSIS — E78.5 HYPERLIPIDEMIA LDL GOAL <130: ICD-10-CM

## 2025-01-14 DIAGNOSIS — F41.9 ANXIETY: ICD-10-CM

## 2025-01-14 NOTE — LETTER
" 1/14/2025      Kimberley Edwards  [address] [place] [address]        [facility]  [facility] Record Number:  [id number]  Place of Service where encounter took place:  [facility] [[id number]]  Chief Complaint   Patient presents with     RECHECK       HPI:    Kimberley Fernandez  is a 84 year old (1940), who is being seen today for an episodic care visit.  HPI information obtained from: facility chart records, facility staff, patient report, and [facility] chart review. Today's concern is:    Seen today for follow up URI. She is upset that her medication never arrived and reports to spending 5 days in bed. She is feeling better today. No cough, SOB, no congestion.     Continues to have a elevated HR. She denies feeling racing heart, chest pain. PMH includes SCC, GERD, Hyperlipidemia, Secondary Hyperparathyroidism, Depression, Osteopenia, B12 and D deficiency. She does not want to take any medications for chronic disease management. Her memory loss is progressing.     Past Medical and Surgical History reviewed in Epic today.    MEDICATIONS:    No current outpatient medications on file.     REVIEW OF SYSTEMS:  4 point ROS including Respiratory, CV, GI and , other than that noted in the HPI,  is negative    Objective:  /64   Pulse 80   Temp 97.4  F (36.3  C)   Resp 22   Ht 1.651 m (5' 5\")   Wt 72.6 kg (160 lb)   SpO2 96%   BMI 26.63 kg/m    EGENERAL APPEARANCE:  Alert, guarded  ENT:  Mouth and posterior oropharynx normal, moist mucous membranes, [place]-reads lips  EYES:  EOM, conjunctivae, lids, pupils and irises normal  RESP:  lungs clear to auscultation   CV:  Palpation and auscultation of heart done , tachy rate and regular rhythm, no murmur, rub, or gallop, trace edema  ABDOMEN:  no guarding or rebound  M/S:   without assistive device  SKIN:  limited but intact to visualized areas   NEURO:   Cranial nerves 2-12 are " normal tested and grossly at patient's baseline  PSYCH:  oriented X 3, anxiousxam:      Labs:   CBC RESULTS:   Recent Labs   Lab Test 02/20/24  1014   WBC 6.7   RBC 5.00   HGB 14.1   HCT 44.4   MCV 89   MCH 28.2   MCHC 31.8   RDW 14.8          Last Basic Metabolic Panel:  Recent Labs   Lab Test 02/20/24  1014      POTASSIUM 4.4   CHLORIDE 105   APRIL 9.1   CO2 26   BUN 17.0   CR 0.98*   GLC 95       Liver Function Studies -   Recent Labs   Lab Test 02/20/24  1014   PROTTOTAL 6.6   ALBUMIN 3.8   BILITOTAL 0.2   ALKPHOS 104   AST 19   ALT 14       TSH   Date Value Ref Range Status   02/20/2024 1.75 0.30 - 4.20 uIU/mL Final   09/28/2012 1.49 0.4 - 5.0 mU/L Final   05/14/2009 1.31 0.4 - 5.0 mU/L Final       Lab Results   Component Value Date    A1C 6.2 10/18/2006    A1C 6.3 10/14/2005     ASSESSMENT/PLAN:  (J06.9) URI with cough and congestion  Comment: resolved  Plan:   -monitor for any additional s/s     (I10) Primary hypertension  (primary encounter diagnosis)  Comment: stable   Plan:   -HR elevates at times     (N25.81) Hyperparathyroidism, secondary (H)  Comment: by history has seen endocrinology in past noted indicate could be related to vitamin D deficiency   Plan:   -refuses supplement      (E78.5) Hyperlipidemia LDL goal <130  Comment: per labs  Plan:   -refuses statin      (M85.80) Osteopenia, unspecified location  Comment: ongoing   Plan:   -refuses Dexa   -refuses d supplement      (F41.9) Anxiety  (R68.89) Forgetfulness  Comment: STML  Plan:   -seems a bit paranoid and overwhelmed      (L98.9) Skin lesion  Comment: on exam-hx of SCC  Plan:   -may decide to follow up with derm      (M25.512, G89.29) chronic left shoulder pain  Comment: ongoing  Plan:  -shoulder injections onsite or in clinic q4 months prn      (N18.31) Stage 3a chronic kidney disease (H)  Comment: chronic   Plan:   -follow BMP and CBC            Electronically signed by:  BROOKE Toth CNP            Sincerely,        Yobani Kelly, BROOKE CNP    Electronically signed

## 2025-03-18 DIAGNOSIS — R21 RASH AND NONSPECIFIC SKIN ERUPTION: Primary | ICD-10-CM

## 2025-03-18 RX ORDER — NYSTATIN 100000 U/G
CREAM TOPICAL 2 TIMES DAILY
Qty: 30 G | Refills: 2 | Status: SHIPPED | OUTPATIENT
Start: 2025-03-18

## 2025-03-18 NOTE — PROGRESS NOTES
Skin fold rash to right upper thigh and lower abdominal stomach. Says it's been going on for 1 week    Plan:   -begin           nystatin (MYCOSTATIN) [id number] UNIT/GM external cream Apply topically 2 times daily. Rub into rash area twice daily until resolved. Resident may keep in apartment          BROOKE Toth CNP on 3/18/2025 at 12:13 PM